# Patient Record
Sex: FEMALE | Race: WHITE | URBAN - METROPOLITAN AREA
[De-identification: names, ages, dates, MRNs, and addresses within clinical notes are randomized per-mention and may not be internally consistent; named-entity substitution may affect disease eponyms.]

---

## 2018-09-11 ENCOUNTER — APPOINTMENT (RX ONLY)
Dept: URBAN - METROPOLITAN AREA CLINIC 142 | Facility: CLINIC | Age: 33
Setting detail: DERMATOLOGY
End: 2018-09-11

## 2018-09-11 DIAGNOSIS — L71.0 PERIORAL DERMATITIS: ICD-10-CM

## 2018-09-11 DIAGNOSIS — L20.89 OTHER ATOPIC DERMATITIS: ICD-10-CM

## 2018-09-11 PROBLEM — J30.1 ALLERGIC RHINITIS DUE TO POLLEN: Status: ACTIVE | Noted: 2018-09-11

## 2018-09-11 PROBLEM — F41.9 ANXIETY DISORDER, UNSPECIFIED: Status: ACTIVE | Noted: 2018-09-11

## 2018-09-11 PROCEDURE — ? PRESCRIPTION

## 2018-09-11 PROCEDURE — ? COUNSELING

## 2018-09-11 PROCEDURE — 99202 OFFICE O/P NEW SF 15 MIN: CPT

## 2018-09-11 RX ORDER — CRISABOROLE 20 MG/G
OINTMENT TOPICAL
Qty: 1 | Refills: 2 | Status: ERX | COMMUNITY
Start: 2018-09-11

## 2018-09-11 RX ORDER — TRIAMCINOLONE ACETONIDE 1 MG/G
CREAM TOPICAL
Qty: 1 | Refills: 3 | Status: ERX | COMMUNITY
Start: 2018-09-11

## 2018-09-11 RX ADMIN — TRIAMCINOLONE ACETONIDE: 1 CREAM TOPICAL at 14:45

## 2018-09-11 RX ADMIN — CRISABOROLE: 20 OINTMENT TOPICAL at 14:42

## 2018-09-11 ASSESSMENT — LOCATION DETAILED DESCRIPTION DERM
LOCATION DETAILED: LEFT ANTECUBITAL SKIN
LOCATION DETAILED: RIGHT ANTERIOR DISTAL THIGH

## 2018-09-11 ASSESSMENT — LOCATION ZONE DERM
LOCATION ZONE: ARM
LOCATION ZONE: LEG

## 2018-09-11 ASSESSMENT — LOCATION SIMPLE DESCRIPTION DERM
LOCATION SIMPLE: LEFT UPPER ARM
LOCATION SIMPLE: RIGHT THIGH

## 2018-09-11 NOTE — HPI: RASH
How Severe Is Your Rash?: mild
Is This A New Presentation, Or A Follow-Up?: Rash
Additional History: Lips swell and have small papules around the perimeter post cold. (7 days) Pt had drank corona with lime and spent the day in the sun.  Red flat lesion on neck. 1.5 weeks. Burning and itching. Treated with cortisone cream- no improvement  Small bumps on knees and hands for 1.5 weeks. Itchy- treated with cortisone with no improvement.

## 2021-07-20 ENCOUNTER — EMERGENCY (EMERGENCY)
Facility: HOSPITAL | Age: 36
LOS: 1 days | Discharge: HOME | End: 2021-07-20
Attending: STUDENT IN AN ORGANIZED HEALTH CARE EDUCATION/TRAINING PROGRAM
Payer: SELF-PAY

## 2021-07-20 VITALS
HEIGHT: 68 IN | HEART RATE: 98 BPM | DIASTOLIC BLOOD PRESSURE: 77 MMHG | OXYGEN SATURATION: 99 % | WEIGHT: 199.96 LBS | SYSTOLIC BLOOD PRESSURE: 114 MMHG | RESPIRATION RATE: 19 BRPM | TEMPERATURE: 99 F

## 2021-07-20 PROCEDURE — 70486 CT MAXILLOFACIAL W/O DYE: CPT | Mod: 26,MA

## 2021-07-20 PROCEDURE — 76377 3D RENDER W/INTRP POSTPROCES: CPT

## 2021-07-20 PROCEDURE — 99285 EMERGENCY DEPT VISIT HI MDM: CPT

## 2021-07-20 PROCEDURE — 99284 EMERGENCY DEPT VISIT MOD MDM: CPT | Mod: 25

## 2021-07-20 PROCEDURE — 76377 3D RENDER W/INTRP POSTPROCES: CPT | Mod: 26

## 2021-07-20 PROCEDURE — 70486 CT MAXILLOFACIAL W/O DYE: CPT

## 2021-07-20 NOTE — ED ADULT TRIAGE NOTE - CHIEF COMPLAINT QUOTE
metal spindle fell off of shelf and hit right cheekbone 1 hour ago. Pt denies LOC, anti-coagulant, headache, blurry vision.

## 2021-07-20 NOTE — ED PROVIDER NOTE - PHYSICAL EXAMINATION
Gen - NAD; well-appearing; A+Ox3   HEENT - NCAT, EOMI without pain, non-proptotic, normal sclera, PERRL; +ecchymosis to infraorbital region, no skin breakdown, mildly tender to touch; no nasal tenderness or septal hematoma  Neck - supple  Resp - CTAB  CV -  RRR  Abd - soft, NT, ND; no guarding or rebound  MSK - 5/5 strength and FROM b/l UE and LE  Extrem - no LE edema/erythema/tenderness  Neuro - no focal motor or sensation deficits

## 2021-07-20 NOTE — ED PROVIDER NOTE - ATTENDING CONTRIBUTION TO CARE
I performed a history and physical exam of the patient and discussed their management with the resident.  I reviewed the resident's note and agree with the documented findings and plan of care except as noted below. My medical decision making and observations are as follows:    36 year old otherwise healthy female presenting with R facial pain s/p injury today. States that she works at [employer], was clearing an item off a ?shelf when a metal spindle fell and struck her below her R eye, denies headstrike/LOC.  No vision changes.  Pt is well appearing with ecchymosis and swelling below right eye along with TTP.  PERRL, EOMI, no signs of entrapment.  Will CT max/fac to eval fro fractures and reassess.  - Laura Waggoner,

## 2021-07-20 NOTE — ED PROVIDER NOTE - PATIENT PORTAL LINK FT
You can access the FollowMyHealth Patient Portal offered by [facility] by registering at the following website: [email or web address]. By joining [employer]’s FollowMyHealth portal, you will also be able to view your health information using other applications (apps) compatible with our system.

## 2021-07-20 NOTE — ED PROVIDER NOTE - OBJECTIVE STATEMENT
36 year old otherwise healthy female presenting with R facial pain s/p injury today. States that she works at [employer], was clearing an item off a ?shelf when a metal spindle fell and struck her below her R eye, denies headstrike/LOC/AC use, currently reports having some numbness over R infraorbital region with mild bruising, mild pain. Did not take anything. Further denies, vision changes, N/V, bleeding, nasal discharge, confusion, memory loss. 36 year old otherwise healthy female presenting with R facial pain s/p injury today. States that she works at [employer], was clearing an item off a ?shelf when a metal spindle fell and struck her below her R eye, denies headstrike/LOC/AC use, currently reports having some numbness over R infraorbital region with mild bruising, mild pain. Did not take anything. Further denies vision changes, N/V, bleeding, nasal discharge, confusion, memory loss.

## 2021-07-20 NOTE — ED ADULT TRIAGE NOTE - ARRIVAL FROM
H/O total hysterectomy  2002  History of appendectomy    History of cholecystectomy    History of tonsillectomy
Home

## 2021-07-20 NOTE — ED PROVIDER NOTE - NSFOLLOWUPINSTRUCTIONS_ED_ALL_ED_FT
You were seen in the Emergency Department for: facial pain after injury    For pain/fever, you may take Tylenol (acetaminophen) 650 mg every 6 hours, OR Advil (ibuprofen) 600 mg every 8 hours.    Please follow up with your primary physician as discussed. If you do not have a primary physician or specialist of your needs, please call [phone] to find one convenient for you. At this number you will be able to locate a provider who accepts your insurance, as well as locate the right specialist for your needs.    You should return to the Emergency Department if you feel any new/worsening/persistent symptoms including but not limited to: chest pain, difficulty breathing, loss of consciousness, bleeding, uncontrolled pain, numbness/weakness of a body part
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
